# Patient Record
Sex: MALE
[De-identification: names, ages, dates, MRNs, and addresses within clinical notes are randomized per-mention and may not be internally consistent; named-entity substitution may affect disease eponyms.]

---

## 2024-08-23 ENCOUNTER — APPOINTMENT (OUTPATIENT)
Dept: ORTHOPEDIC SURGERY | Facility: CLINIC | Age: 24
End: 2024-08-23
Payer: COMMERCIAL

## 2024-08-23 VITALS — WEIGHT: 155 LBS | BODY MASS INDEX: 22.96 KG/M2 | HEIGHT: 69 IN

## 2024-08-23 DIAGNOSIS — M79.675 PAIN IN LEFT TOE(S): ICD-10-CM

## 2024-08-23 PROBLEM — Z00.00 ENCOUNTER FOR PREVENTIVE HEALTH EXAMINATION: Status: ACTIVE | Noted: 2024-08-23

## 2024-08-23 PROCEDURE — 73630 X-RAY EXAM OF FOOT: CPT | Mod: LT

## 2024-08-23 PROCEDURE — 99203 OFFICE O/P NEW LOW 30 MIN: CPT

## 2024-08-26 PROBLEM — M79.675 PAIN OF TOE OF LEFT FOOT: Status: ACTIVE | Noted: 2024-08-23

## 2024-08-26 NOTE — DISCUSSION/SUMMARY
[de-identified] : Discussed findings of today's exam and possible causes of patient's pain.  Educated patient on their most probable diagnosis of left foot third digit contusion with resultant mild sprain of the PIP and DIP joints.  Reviewed possible courses of treatment, and we collaboratively decided best course of treatment at this time will include conservative management.  Patient has no evidence of fracture on x-ray.  No evidence of any high-grade soft tissue injury on clinical exam.  Recommend watchful waiting and activity modification regarding his activities at the [employer].  Patient will follow-up in 2 weeks for reassessment.  Patient appreciates and agrees with current plan.  This note was generated using dragon medical dictation software.  A reasonable effort has been made for proofreading its contents, but typos may still remain.  If there are any questions or points of clarification needed please notify my office.

## 2024-08-26 NOTE — HISTORY OF PRESENT ILLNESS
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 he has been polo taping his toe, he felt signifiacnt discomfort wearing his leather uniform shoes so went to student health who referred him here

## 2024-08-26 NOTE — PHYSICAL EXAM
[de-identified] : Constitutional: Well-nourished, well-developed, No acute distress Respiratory:  Good respiratory effort, no SOB Lymphatic: No regional lymphadenopathy, no lymphedema Psychiatric: Pleasant and normal affect, alert and oriented x3 Musculoskeletal: normal except where as noted in regional exam  Left foot: APPEARANCE: + Third digit PIP and DIP joint swelling, no marked deformities or malalignment POSITIVE TENDERNESS: + TTP of the third digit PIP and DIP joints NONTENDER: 5th metatarsal base, cuboid, 1st MTP, dorsum & plantar surfaces, medial heel, mid heel.  ROM: normal throughout foot, ankle, and digits.  RESISTIVE TESTING: painless flex/ext, abd/add of all digits.   [de-identified] : The following radiographs were ordered and read by me during this patient's visit. I reviewed each radiograph in detail with the patient and discussed the findings as highlighted below.   3 views of the left foot were obtained today that show no fracture, or dislocation. There are no degenerative changes seen. There is no malalignment. No obvious osseous abnormality. Otherwise unremarkable.

## 2024-08-26 NOTE — DISCUSSION/SUMMARY
[de-identified] : Discussed findings of today's exam and possible causes of patient's pain.  Educated patient on their most probable diagnosis of left foot third digit contusion with resultant mild sprain of the PIP and DIP joints.  Reviewed possible courses of treatment, and we collaboratively decided best course of treatment at this time will include conservative management.  Patient has no evidence of fracture on x-ray.  No evidence of any high-grade soft tissue injury on clinical exam.  Recommend watchful waiting and activity modification regarding his activities at the [facility] [employer].  Patient will follow-up in 2 weeks for reassessment.  Patient appreciates and agrees with current plan.  This note was generated using dragon medical dictation software.  A reasonable effort has been made for proofreading its contents, but typos may still remain.  If there are any questions or points of clarification needed please notify my office.

## 2024-08-26 NOTE — HISTORY OF PRESENT ILLNESS
[de-identified] : 23 M [employer] student presents with left 3rd toe pain since hitting it on a root while hiking on a camping trip last Sunday (5 days ago). he has been polo taping his toe, he felt signifiacnt discomfort wearing his leather uniform shoes so went to student health who referred him here

## 2024-08-26 NOTE — PHYSICAL EXAM
[de-identified] : Constitutional: Well-nourished, well-developed, No acute distress Respiratory:  Good respiratory effort, no SOB Lymphatic: No regional lymphadenopathy, no lymphedema Psychiatric: Pleasant and normal affect, alert and oriented x3 Musculoskeletal: normal except where as noted in regional exam  Left foot: APPEARANCE: + Third digit PIP and DIP joint swelling, no marked deformities or malalignment POSITIVE TENDERNESS: + TTP of the third digit PIP and DIP joints NONTENDER: 5th metatarsal base, cuboid, 1st MTP, dorsum & plantar surfaces, medial heel, mid heel.  ROM: normal throughout foot, ankle, and digits.  RESISTIVE TESTING: painless flex/ext, abd/add of all digits.   [de-identified] : The following radiographs were ordered and read by me during this patient's visit. I reviewed each radiograph in detail with the patient and discussed the findings as highlighted below.   3 views of the left foot were obtained today that show no fracture, or dislocation. There are no degenerative changes seen. There is no malalignment. No obvious osseous abnormality. Otherwise unremarkable.

## 2024-09-06 ENCOUNTER — APPOINTMENT (OUTPATIENT)
Dept: ORTHOPEDIC SURGERY | Facility: CLINIC | Age: 24
End: 2024-09-06

## 2024-09-06 NOTE — HISTORY OF PRESENT ILLNESS
[de-identified] : 23 M [employer] student followup with left 3rd toe pain  previous visit found to have left foot third digit contusion with resultant mild sprain of the PIP and DIP joints.